# Patient Record
Sex: FEMALE | Race: WHITE | NOT HISPANIC OR LATINO | Employment: FULL TIME | URBAN - METROPOLITAN AREA
[De-identification: names, ages, dates, MRNs, and addresses within clinical notes are randomized per-mention and may not be internally consistent; named-entity substitution may affect disease eponyms.]

---

## 2024-06-13 ENCOUNTER — OFFICE VISIT (OUTPATIENT)
Dept: PRIMARY CARE | Facility: CLINIC | Age: 39
End: 2024-06-13
Payer: COMMERCIAL

## 2024-06-13 VITALS
BODY MASS INDEX: 29.19 KG/M2 | HEART RATE: 84 BPM | SYSTOLIC BLOOD PRESSURE: 115 MMHG | HEIGHT: 62 IN | DIASTOLIC BLOOD PRESSURE: 78 MMHG | WEIGHT: 158.6 LBS | OXYGEN SATURATION: 95 % | RESPIRATION RATE: 18 BRPM | TEMPERATURE: 97.7 F

## 2024-06-13 DIAGNOSIS — R10.31 RLQ ABDOMINAL PAIN: ICD-10-CM

## 2024-06-13 DIAGNOSIS — Z13.29 THYROID DISORDER SCREENING: Primary | ICD-10-CM

## 2024-06-13 DIAGNOSIS — Z00.00 GENERAL MEDICAL EXAM: ICD-10-CM

## 2024-06-13 DIAGNOSIS — Z13.220 SCREENING, LIPID: ICD-10-CM

## 2024-06-13 PROCEDURE — 1036F TOBACCO NON-USER: CPT | Performed by: NURSE PRACTITIONER

## 2024-06-13 PROCEDURE — 99385 PREV VISIT NEW AGE 18-39: CPT | Performed by: NURSE PRACTITIONER

## 2024-06-13 ASSESSMENT — ENCOUNTER SYMPTOMS
COUGH: 0
SLEEP DISTURBANCE: 0
CONSTITUTIONAL NEGATIVE: 1
WEAKNESS: 0
APNEA: 0
FEVER: 0
NAUSEA: 0
DIZZINESS: 0
CHILLS: 0
SPEECH DIFFICULTY: 0
ACTIVITY CHANGE: 0
VOMITING: 0
ABDOMINAL PAIN: 1
CONFUSION: 0
DIARRHEA: 0
MYALGIAS: 0
WOUND: 0
NERVOUS/ANXIOUS: 0
SHORTNESS OF BREATH: 0
PALPITATIONS: 0
ARTHRALGIAS: 0
SORE THROAT: 0
HEADACHES: 0

## 2024-06-13 ASSESSMENT — PATIENT HEALTH QUESTIONNAIRE - PHQ9
2. FEELING DOWN, DEPRESSED OR HOPELESS: NOT AT ALL
1. LITTLE INTEREST OR PLEASURE IN DOING THINGS: NOT AT ALL
SUM OF ALL RESPONSES TO PHQ9 QUESTIONS 1 AND 2: 0

## 2024-06-13 NOTE — PROGRESS NOTES
"Subjective   Patient ID: Alicja Morales is a 38 y.o. female who presents for New Patient Visit, Annual Exam, and Abdominal Pain.    Annual Physical Exam   39 yo female Patient here to establish care.  Past medical history of migraines/well-controlled with over-the-counter meds  Patient here to discuss abdominal pain x 2.  Mostly on the right lower quadrant-had 2 episodes 1 last Thursday and 1 Saturday morning where she developed a stabbing pain in the right lower quadrant/pelvic region.  Lasted for 30 minutes.  It went away without any treatment  Denies nausea/vomiting  Baseline diarrhea.  Patient does not want further workup-she feels that is her normal    Overweight: BMI 29.01  Unremarkable blood pressure and heart rate        GI Problem  The primary symptoms include abdominal pain. Primary symptoms do not include fever, nausea, vomiting, diarrhea, myalgias, arthralgias or rash.   The illness does not include chills.        Review of Systems   Constitutional: Negative.  Negative for activity change, chills and fever.   HENT:  Negative for congestion, postnasal drip, sneezing and sore throat.    Eyes:  Negative for visual disturbance.   Respiratory:  Negative for apnea, cough and shortness of breath.    Cardiovascular:  Negative for chest pain and palpitations.   Gastrointestinal:  Positive for abdominal pain. Negative for diarrhea, nausea and vomiting.   Musculoskeletal:  Negative for arthralgias and myalgias.   Skin:  Negative for rash and wound.   Neurological:  Negative for dizziness, syncope, speech difficulty, weakness and headaches.   Psychiatric/Behavioral:  Negative for confusion, self-injury, sleep disturbance and suicidal ideas. The patient is not nervous/anxious.        Objective   /78   Pulse 84   Temp 36.5 °C (97.7 °F) (Temporal)   Resp 18   Ht 1.575 m (5' 2\")   Wt 71.9 kg (158 lb 9.6 oz)   SpO2 95%   BMI 29.01 kg/m²     Physical Exam  Constitutional:       Appearance: Normal " appearance.   Cardiovascular:      Rate and Rhythm: Normal rate and regular rhythm.      Pulses: Normal pulses.      Heart sounds: Normal heart sounds.   Pulmonary:      Effort: Pulmonary effort is normal.      Breath sounds: Normal breath sounds.   Abdominal:      General: Bowel sounds are normal.      Palpations: Abdomen is soft. There is no mass.      Tenderness: There is no guarding or rebound.       Neurological:      Mental Status: She is alert and oriented to person, place, and time.   Psychiatric:         Mood and Affect: Mood normal.         Behavior: Behavior normal.       Health Maintenance Topics with due status: Overdue       Topic Date Due    Lipid Panel Never done    Varicella Vaccines Never done    Diabetes Screening Never done    Hepatitis B Vaccines Never done    COVID-19 Vaccine Never done     Health Maintenance Topics with due status: Not Due       Topic Last Completion Date    DTaP/Tdap/Td Vaccines 08/10/2014    Cervical Cancer Screening 03/21/2022    Influenza Vaccine 10/05/2022    Yearly Adult Physical 06/13/2024    Zoster Vaccines Not Due     Health Maintenance Topics with due status: Completed       Topic Last Completion Date    MMR Vaccines 05/18/1998     Health Maintenance Topics with due status: Aged Out       Topic Date Due    HIB Vaccines Aged Out    IPV Vaccines Aged Out    Hepatitis A Vaccines Aged Out    Meningococcal Vaccine Aged Out    Rotavirus Vaccines Aged Out    HPV Vaccines Aged Out    Pneumococcal Vaccine: Pediatrics (0 to 5 Years) and At-Risk Patients (6 to 64 Years) Aged Out     Health Maintenance Topics with due status: Discontinued       Topic Date Due    HIV Screening Discontinued    Hepatitis C Screening Discontinued     Health Maintenance Topics with due status: Overdue       Topic Date Due    Lipid Panel Never done    Varicella Vaccines Never done    Diabetes Screening Never done    Hepatitis B Vaccines Never done    COVID-19 Vaccine Never done     Health Maintenance  Topics with due status: Not Due       Topic Last Completion Date    DTaP/Tdap/Td Vaccines 08/10/2014    Cervical Cancer Screening 03/21/2022    Influenza Vaccine 10/05/2022    Yearly Adult Physical 06/13/2024    Zoster Vaccines Not Due     Health Maintenance Topics with due status: Completed       Topic Last Completion Date    MMR Vaccines 05/18/1998     Health Maintenance Topics with due status: Aged Out       Topic Date Due    HIB Vaccines Aged Out    IPV Vaccines Aged Out    Hepatitis A Vaccines Aged Out    Meningococcal Vaccine Aged Out    Rotavirus Vaccines Aged Out    HPV Vaccines Aged Out    Pneumococcal Vaccine: Pediatrics (0 to 5 Years) and At-Risk Patients (6 to 64 Years) Aged Out     Health Maintenance Topics with due status: Discontinued       Topic Date Due    HIV Screening Discontinued    Hepatitis C Screening Discontinued       Assessment/Plan   Problem List Items Addressed This Visit             ICD-10-CM    RLQ abdominal pain R10.31     Declines workup.  Fasting lab panel ordered  History of ablation.  No periods         General medical exam Z00.00     Fasting lab panel ordered          Other Visit Diagnoses         Codes    Thyroid disorder screening    -  Primary Z13.29

## 2025-06-17 ENCOUNTER — APPOINTMENT (OUTPATIENT)
Dept: PRIMARY CARE | Facility: CLINIC | Age: 40
End: 2025-06-17
Payer: COMMERCIAL